# Patient Record
Sex: MALE | Race: OTHER | HISPANIC OR LATINO
[De-identification: names, ages, dates, MRNs, and addresses within clinical notes are randomized per-mention and may not be internally consistent; named-entity substitution may affect disease eponyms.]

---

## 2018-09-06 ENCOUNTER — TRANSCRIPTION ENCOUNTER (OUTPATIENT)
Age: 53
End: 2018-09-06

## 2018-09-07 ENCOUNTER — EMERGENCY (EMERGENCY)
Facility: HOSPITAL | Age: 53
LOS: 1 days | Discharge: HOME | End: 2018-09-07
Admitting: EMERGENCY MEDICINE
Payer: COMMERCIAL

## 2018-09-07 VITALS
SYSTOLIC BLOOD PRESSURE: 163 MMHG | DIASTOLIC BLOOD PRESSURE: 80 MMHG | OXYGEN SATURATION: 97 % | HEART RATE: 86 BPM | TEMPERATURE: 99 F | RESPIRATION RATE: 18 BRPM

## 2018-09-07 PROBLEM — Z00.00 ENCOUNTER FOR PREVENTIVE HEALTH EXAMINATION: Status: ACTIVE | Noted: 2018-09-07

## 2018-09-07 LAB
ANION GAP SERPL CALC-SCNC: 12 MMOL/L — SIGNIFICANT CHANGE UP (ref 5–17)
BUN SERPL-MCNC: 13 MG/DL — SIGNIFICANT CHANGE UP (ref 7–23)
CALCIUM SERPL-MCNC: 8.9 MG/DL — SIGNIFICANT CHANGE UP (ref 8.4–10.5)
CHLORIDE SERPL-SCNC: 106 MMOL/L — SIGNIFICANT CHANGE UP (ref 96–108)
CO2 SERPL-SCNC: 23 MMOL/L — SIGNIFICANT CHANGE UP (ref 22–31)
CREAT SERPL-MCNC: 0.85 MG/DL — SIGNIFICANT CHANGE UP (ref 0.5–1.3)
GLUCOSE SERPL-MCNC: 129 MG/DL — HIGH (ref 70–99)
HCT VFR BLD CALC: 45 % — SIGNIFICANT CHANGE UP (ref 39–50)
HGB BLD-MCNC: 15.6 G/DL — SIGNIFICANT CHANGE UP (ref 13–17)
MCHC RBC-ENTMCNC: 30.5 PG — SIGNIFICANT CHANGE UP (ref 27–34)
MCHC RBC-ENTMCNC: 34.6 GM/DL — SIGNIFICANT CHANGE UP (ref 32–36)
MCV RBC AUTO: 88.3 FL — SIGNIFICANT CHANGE UP (ref 80–100)
PLATELET # BLD AUTO: 206 K/UL — SIGNIFICANT CHANGE UP (ref 150–400)
POTASSIUM SERPL-MCNC: 3.9 MMOL/L — SIGNIFICANT CHANGE UP (ref 3.5–5.3)
POTASSIUM SERPL-SCNC: 3.9 MMOL/L — SIGNIFICANT CHANGE UP (ref 3.5–5.3)
RBC # BLD: 5.1 M/UL — SIGNIFICANT CHANGE UP (ref 4.2–5.8)
RBC # FLD: 12.5 % — SIGNIFICANT CHANGE UP (ref 10.3–14.5)
SODIUM SERPL-SCNC: 141 MMOL/L — SIGNIFICANT CHANGE UP (ref 135–145)
WBC # BLD: 7.3 K/UL — SIGNIFICANT CHANGE UP (ref 3.8–10.5)
WBC # FLD AUTO: 7.3 K/UL — SIGNIFICANT CHANGE UP (ref 3.8–10.5)

## 2018-09-07 PROCEDURE — 85027 COMPLETE CBC AUTOMATED: CPT

## 2018-09-07 PROCEDURE — 76377 3D RENDER W/INTRP POSTPROCES: CPT | Mod: 26

## 2018-09-07 PROCEDURE — 99284 EMERGENCY DEPT VISIT MOD MDM: CPT

## 2018-09-07 PROCEDURE — 70486 CT MAXILLOFACIAL W/O DYE: CPT | Mod: 26

## 2018-09-07 PROCEDURE — 70450 CT HEAD/BRAIN W/O DYE: CPT | Mod: 26

## 2018-09-07 PROCEDURE — 90715 TDAP VACCINE 7 YRS/> IM: CPT

## 2018-09-07 PROCEDURE — 90471 IMMUNIZATION ADMIN: CPT

## 2018-09-07 PROCEDURE — 96361 HYDRATE IV INFUSION ADD-ON: CPT

## 2018-09-07 PROCEDURE — 70486 CT MAXILLOFACIAL W/O DYE: CPT

## 2018-09-07 PROCEDURE — 76377 3D RENDER W/INTRP POSTPROCES: CPT

## 2018-09-07 PROCEDURE — 80048 BASIC METABOLIC PNL TOTAL CA: CPT

## 2018-09-07 PROCEDURE — 70450 CT HEAD/BRAIN W/O DYE: CPT

## 2018-09-07 PROCEDURE — 96365 THER/PROPH/DIAG IV INF INIT: CPT

## 2018-09-07 PROCEDURE — 99284 EMERGENCY DEPT VISIT MOD MDM: CPT | Mod: 25

## 2018-09-07 RX ORDER — SODIUM CHLORIDE 9 MG/ML
1000 INJECTION, SOLUTION INTRAVENOUS
Qty: 0 | Refills: 0 | Status: DISCONTINUED | OUTPATIENT
Start: 2018-09-07 | End: 2018-09-11

## 2018-09-07 RX ORDER — CEFAZOLIN SODIUM 1 G
2000 VIAL (EA) INJECTION ONCE
Qty: 0 | Refills: 0 | Status: COMPLETED | OUTPATIENT
Start: 2018-09-07 | End: 2018-09-07

## 2018-09-07 RX ORDER — TETANUS TOXOID, REDUCED DIPHTHERIA TOXOID AND ACELLULAR PERTUSSIS VACCINE, ADSORBED 5; 2.5; 8; 8; 2.5 [IU]/.5ML; [IU]/.5ML; UG/.5ML; UG/.5ML; UG/.5ML
0.5 SUSPENSION INTRAMUSCULAR ONCE
Qty: 0 | Refills: 0 | Status: COMPLETED | OUTPATIENT
Start: 2018-09-07 | End: 2018-09-07

## 2018-09-07 RX ORDER — MORPHINE SULFATE 50 MG/1
4 CAPSULE, EXTENDED RELEASE ORAL ONCE
Qty: 0 | Refills: 0 | Status: DISCONTINUED | OUTPATIENT
Start: 2018-09-07 | End: 2018-09-07

## 2018-09-07 RX ADMIN — SODIUM CHLORIDE 100 MILLILITER(S): 9 INJECTION, SOLUTION INTRAVENOUS at 20:51

## 2018-09-07 RX ADMIN — Medication 2000 MILLIGRAM(S): at 12:05

## 2018-09-07 RX ADMIN — TETANUS TOXOID, REDUCED DIPHTHERIA TOXOID AND ACELLULAR PERTUSSIS VACCINE, ADSORBED 0.5 MILLILITER(S): 5; 2.5; 8; 8; 2.5 SUSPENSION INTRAMUSCULAR at 11:19

## 2018-09-07 RX ADMIN — Medication 100 MILLIGRAM(S): at 11:19

## 2018-09-07 NOTE — ED PROVIDER NOTE - PROGRESS NOTE DETAILS
Nj Waters MD, PGY3: Patient received at resident sign out. Dental removed loose tooth. Discussed with Dr. Carvalho regarding persistent subjective malocclusion of the jaw and Dr. Carvalho continues to recommend outpatient follow up with Dr. Houser. Will prescribe antibiotics Patient informed of ED visit findings, understands plan.  Patient provided with written and further verbal instructions not included in discharge paperwork.  Patient instructed to follow up with their primary care physician in 2-3 days and return for new, worsened, or persistent symptoms. Nj Waters, PGY3: Dr Carvalho returned call and requested evaluation in ED. Discussed with patient who will stay Nj Waters MD, PGY3: Patient received at resident sign out. Dental removed loose tooth. Discussed with Dr. Carvalho regarding persistent subjective malocclusion of the jaw and Dr. Carvalho continues to recommend outpatient follow up with Dr. Houser. Will prescribe antibiotics Nj Waters, PGY3: Fracture reduced by Dr Carvalho and dental. Stable for discharge. Patient informed of ED visit findings, understands plan.  Patient provided with written and further verbal instructions not included in discharge paperwork.  Patient instructed to follow up with their primary care physician in 2-3 days and return for new, worsened, or persistent symptoms. Nj Waters, PGY3: Discharged patient prior to getting information for pharmacy. Left voicemail at 1522 for followup requesting amoxicillin 500mg BID x 5 days. I also left voicemail for patient.

## 2018-09-07 NOTE — ED PROVIDER NOTE - ATTENDING CONTRIBUTION TO CARE
MD Erickson:  patient seen and evaluated with the resident.  I was present for key portions of the History & Physical, and I agree with the Impression & Plan.  MD Erickson:  54 yo M, complex L facial laceration s/p blunt trauma.  No LOC.  Extent of injury merits CT max/face & head.  EOMI, PERRL, no blurry vision no double vision.  Impression:  complex facial laceration.  plan: ct, plastic to fix.

## 2018-09-07 NOTE — CONSULT NOTE ADULT - SUBJECTIVE AND OBJECTIVE BOX
HPI: 53M w/ PMH HLD who was polishing a brick at around 930 AM on the day of presentation when the brick was kicked off the polisher and struck the patient in his face.  He did not lose consciousness and has no injuries other than his facial injuries.    He is complaining of pain in the left side of his face and a loose tooth (left maxillary lateral incisor).  He states that his teeth do not fit together when he tries to close his mouth and that his vision is different.      PMH  HLD (hyperlipidemia)    PSH  No significant past surgical history      Allergies    No Known Allergies          Physical Exam  T(C): --  HR: 68 (09-07-18 @ 10:40) (68 - 86)  BP: 130/81 (09-07-18 @ 10:40) (130/81 - 163/80)  RR: 16 (09-07-18 @ 10:40) (16 - 18)  SpO2: 98% (09-07-18 @ 10:40) (97% - 98%)  Wt(kg): --  Tmax: T(C): --  Wt(kg): --    Gen; NAD resting comfortably in stretcher breathing room air  HEENT:  Upper 1/3 of face:  Left eye with conjunctival injection.  No obvious step offs, lacerations, hematomas, or abrasions.  No evidence of enophthalmos.  EOMI, PERRLA. SILT bilaterally  Middle 1/3 of face: Left malar region with significant soft tissue swelling, consistent with buccal hematoma.  Stellate laceration from left nasolabial crease through upper lip.  Abrasion with some loss of epidermis over left cheek.  Some dried blood at nares.  Lower 1/3 of face: Through and through laceration of left upper lip s/p repair. Mobile left lateral incision of maxilla. Ecchymoses of buccal mucosa and mucosa of lip. No subungual hematoma.  Slight anterior open bite on left. Maxilla is not mobile.  Bimanual exam of the mandible reveals no mobility along the length of the mandible.  DEVON 3-4cm        Labs:                        15.6   7.3   )-----------( 206      ( 07 Sep 2018 10:54 )             45.0     09-07    141  |  106  |  13  ----------------------------<  129<H>  3.9   |  23  |  0.85    Ca    8.9      07 Sep 2018 10:54      Imaging  < from: CT Maxillofacial No Cont (09.07.18 @ 10:50) >  Maxillofacial CT:    A left premaxillary soft tissue hematoma is present with associated skin   laceration.    Comminuted fractures involve the anterior wall of the left maxillary   sinus, which extend cephalad to involve the inferior orbital rim and the   infraorbital foramen. Horizontally oriented fractures involve the   alveolus of the left maxilla, involving the roots of the lateral incisor   and canine teeth, with extension of the fracture margins posteriorly   through the alveolus to the base of the pterygoid plates. Some of the   fracture margins extend to the lateral most aspect of the hard palate,   and fractures involve the floor of the left maxillary sinus. Secondary   opacification of left maxillary sinus with hemorrhage is noted.    There is no evidence for globe rupture or post septal hemorrhage.    Nondisplaced linear lucencies are noted involving the right maxillary   sinus; it is not certain whether these reflect vascular grooves or   nondisplaced fractures.    The medial walls and floors of the orbits appear preserved.    IMPRESSION:    Head CT:    No evidence for calvarial fracture or acute intracranial hemorrhage.    Maxillofacial CT:    Complex fractures involve the left maxilla as described (LeFort type   fracture).    < end of copied text > HPI: 53M w/ PMH HLD who was polishing a brick at around 930 AM on the day of presentation when the brick was kicked off the polisher and struck the patient in his face.  He did not lose consciousness and has no injuries other than his facial injuries.    He is complaining of pain in the left side of his face and a loose tooth (left maxillary lateral incisor).  He states that his teeth do not fit together when he tries to close his mouth and that his vision is different.      PMH  HLD (hyperlipidemia)    PSH  No significant past surgical history      Allergies    No Known Allergies          Physical Exam  T(C): --  HR: 68 (09-07-18 @ 10:40) (68 - 86)  BP: 130/81 (09-07-18 @ 10:40) (130/81 - 163/80)  RR: 16 (09-07-18 @ 10:40) (16 - 18)  SpO2: 98% (09-07-18 @ 10:40) (97% - 98%)  Wt(kg): --  Tmax: T(C): --  Wt(kg): --    Gen; NAD resting comfortably in stretcher breathing room air  HEENT:  Upper 1/3 of face:  Left eye with conjunctival injection.  No obvious step offs, lacerations, hematomas, or abrasions.  No evidence of enophthalmos.  EOMI, PERRLA. SILT bilaterally.  Patient initially reports decreased visual acuity in left eye but on subsequent exam reports vision is clear  Middle 1/3 of face: Left malar region with significant soft tissue swelling, consistent with buccal hematoma.  Stellate laceration from left nasolabial crease through upper lip.  Abrasion with some loss of epidermis over left cheek.  Some dried blood at nares.  Lower 1/3 of face: Through and through laceration of left upper lip s/p repair. Mobile left lateral incision of maxilla. Ecchymoses of buccal mucosa and mucosa of lip. No subungual hematoma.  Slight anterior open bite on left. Maxilla is not mobile.  Bimanual exam of the mandible reveals no mobility along the length of the mandible.  DEVON 3-4cm        Labs:                        15.6   7.3   )-----------( 206      ( 07 Sep 2018 10:54 )             45.0     09-07    141  |  106  |  13  ----------------------------<  129<H>  3.9   |  23  |  0.85    Ca    8.9      07 Sep 2018 10:54      Imaging  < from: CT Maxillofacial No Cont (09.07.18 @ 10:50) >  Maxillofacial CT:    A left premaxillary soft tissue hematoma is present with associated skin   laceration.    Comminuted fractures involve the anterior wall of the left maxillary   sinus, which extend cephalad to involve the inferior orbital rim and the   infraorbital foramen. Horizontally oriented fractures involve the   alveolus of the left maxilla, involving the roots of the lateral incisor   and canine teeth, with extension of the fracture margins posteriorly   through the alveolus to the base of the pterygoid plates. Some of the   fracture margins extend to the lateral most aspect of the hard palate,   and fractures involve the floor of the left maxillary sinus. Secondary   opacification of left maxillary sinus with hemorrhage is noted.    There is no evidence for globe rupture or post septal hemorrhage.    Nondisplaced linear lucencies are noted involving the right maxillary   sinus; it is not certain whether these reflect vascular grooves or   nondisplaced fractures.    The medial walls and floors of the orbits appear preserved.    IMPRESSION:    Head CT:    No evidence for calvarial fracture or acute intracranial hemorrhage.    Maxillofacial CT:    Complex fractures involve the left maxilla as described (LeFort type   fracture).    < end of copied text > HPI: 53M w/ PMH HLD who was polishing a brick at around 930 AM on the day of presentation when the brick was kicked off the polisher and struck the patient in his face.  He did not lose consciousness and has no injuries other than his facial injuries.    He is complaining of pain in the left side of his face and a loose tooth (left maxillary lateral incisor).  He states that his teeth do not fit together when he tries to close his mouth and that his vision is different.      PMH  HLD (hyperlipidemia)    PSH  No significant past surgical history      Allergies    No Known Allergies          Physical Exam  T(C): --  HR: 68 (09-07-18 @ 10:40) (68 - 86)  BP: 130/81 (09-07-18 @ 10:40) (130/81 - 163/80)  RR: 16 (09-07-18 @ 10:40) (16 - 18)  SpO2: 98% (09-07-18 @ 10:40) (97% - 98%)  Wt(kg): --  Tmax: T(C): --  Wt(kg): --    Gen; NAD resting comfortably in stretcher breathing room air  HEENT:  Upper 1/3 of face:  Left eye with conjunctival injection.  No obvious step offs, lacerations, hematomas, or abrasions.  No evidence of enophthalmos.  EOMI, PERRLA. SILT bilaterally.  Patient initially reports decreased visual acuity in left eye but on subsequent exam reports vision is clear  Middle 1/3 of face: Left malar region with significant soft tissue swelling, consistent with buccal hematoma.  Stellate laceration from left nasolabial crease through upper lip.  Abrasion with some loss of epidermis over left cheek.  Some dried blood at nares.  No septal hematoma, but deviated septum noted.  No hemotympanum.  No etienne sign.  Lower 1/3 of face: Through and through laceration of left upper lip s/p repair. Mobile left lateral incision of maxilla. Ecchymoses of buccal mucosa and mucosa of lip. No subungual hematoma.  Slight anterior open bite on left. Maxilla is not mobile.  Bimanual exam of the mandible reveals no mobility along the length of the mandible.  DEVON 3-4cm        Labs:                        15.6   7.3   )-----------( 206      ( 07 Sep 2018 10:54 )             45.0     09-07    141  |  106  |  13  ----------------------------<  129<H>  3.9   |  23  |  0.85    Ca    8.9      07 Sep 2018 10:54      Imaging  < from: CT Maxillofacial No Cont (09.07.18 @ 10:50) >  Maxillofacial CT:    A left premaxillary soft tissue hematoma is present with associated skin   laceration.    Comminuted fractures involve the anterior wall of the left maxillary   sinus, which extend cephalad to involve the inferior orbital rim and the   infraorbital foramen. Horizontally oriented fractures involve the   alveolus of the left maxilla, involving the roots of the lateral incisor   and canine teeth, with extension of the fracture margins posteriorly   through the alveolus to the base of the pterygoid plates. Some of the   fracture margins extend to the lateral most aspect of the hard palate,   and fractures involve the floor of the left maxillary sinus. Secondary   opacification of left maxillary sinus with hemorrhage is noted.    There is no evidence for globe rupture or post septal hemorrhage.    Nondisplaced linear lucencies are noted involving the right maxillary   sinus; it is not certain whether these reflect vascular grooves or   nondisplaced fractures.    The medial walls and floors of the orbits appear preserved.    IMPRESSION:    Head CT:    No evidence for calvarial fracture or acute intracranial hemorrhage.    Maxillofacial CT:    Complex fractures involve the left maxilla as described (LeFort type   fracture).    < end of copied text >

## 2018-09-07 NOTE — ED ADULT NURSE NOTE - OBJECTIVE STATEMENT
53M comes to ED s/p lip injury while using a "stone saw". He states a large stone broke off and struck him in the face and head. He comes to ED via EMS. He has large laceration to upper lip in V shape with linear laceration to lower lip. He states "it doesn't really hurt". He has unknown last tdap. Bleeding is oozing. PMH high cholesterol. Has complaints of headache. denies blurry vision/N/V/D/abdominal pain/numbness/tingling. Will continue to monitor.

## 2018-09-07 NOTE — ED ADULT NURSE NOTE - NSIMPLEMENTINTERV_GEN_ALL_ED
Implemented All Universal Safety Interventions:  [place] to call system. Call bell, personal items and telephone within reach. Instruct patient to call for assistance. Room bathroom lighting operational. Non-slip footwear when patient is off stretcher. Physically safe environment: no spills, clutter or unnecessary equipment. Stretcher in lowest position, wheels locked, appropriate side rails in place.

## 2018-09-07 NOTE — ED PROVIDER NOTE - CONSTITUTIONAL, MLM
normal... +gross facial laceration, +protecting airway, minimal bleeding, appearing, well nourished, awake, alert, oriented to person, place, time/situation,

## 2018-09-07 NOTE — ED PROVIDER NOTE - OBJECTIVE STATEMENT
Onset:  1 hr PTA.  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  Associated Sx:  Zygoma swelling, no LOC, no neck pain.  NO AC.  Better/worse: no clear modifiers. Onset:  1 hr PTA.  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  Associated Sx:  Zygoma swelling, no LOC, no neck pain.  NO AC.  Better/worse: no clear modifiers.    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 when a piece of the stone flew off and hit him in the face, has large stellate facial laceration and complaining of blurred vision to L eye. Unknown last tetanus shot.

## 2018-09-07 NOTE — CONSULT NOTE ADULT - ASSESSMENT
A/P 53M s/p blunt injury to face resulting in soft tissue laceration and facial fracture  - Facial lacerations sutured by plastics (consult note to be dictated)  - Recommend dental consult for loose tooth  - Recommend ophthalmology consult for decreased visual acuity left eye without symptoms of diplopia  - Sinus precautions: Avoid nose blowing, sneeze with open mouth, avoid CPAP/BiPAP, and maintain HOB elevation. A/P 53M s/p blunt injury to face resulting in soft tissue laceration and facial fracture  - Facial lacerations sutured by plastics (consult note to be dictated)  - Recommend dental consult for loose tooth  - Sinus precautions: Avoid nose blowing, sneeze with open mouth, avoid CPAP/BiPAP, and maintain HOB elevation. A/P 53M s/p blunt injury to face resulting in soft tissue laceration and facial fracture  - Facial lacerations sutured by plastics (consult note to be dictated)  - Recommend dental consult for loose tooth  - Sinus precautions: Avoid nose blowing, sneeze with open mouth, avoid CPAP/BiPAP, and maintain HOB elevation.  - No evidence of significantly displaced fracture segments requiring operative fixation.  Recommend that patient continue soft diet and follow up as needed with Dr Houser.  Patient may call [phone] to schedule an appointment.    Discussed with Dr Houser and patient    Lou Cabrera PGY3

## 2018-09-08 VITALS
DIASTOLIC BLOOD PRESSURE: 84 MMHG | RESPIRATION RATE: 16 BRPM | TEMPERATURE: 100 F | HEART RATE: 90 BPM | SYSTOLIC BLOOD PRESSURE: 130 MMHG | OXYGEN SATURATION: 98 %

## 2018-09-08 RX ORDER — AMOXICILLIN 250 MG/5ML
1 SUSPENSION, RECONSTITUTED, ORAL (ML) ORAL
Qty: 10 | Refills: 0 | OUTPATIENT
Start: 2018-09-08 | End: 2018-09-12

## 2018-09-08 RX ADMIN — SODIUM CHLORIDE 1000 MILLILITER(S): 9 INJECTION, SOLUTION INTRAVENOUS at 02:12

## 2018-09-08 NOTE — ED POST DISCHARGE NOTE - RESULT SUMMARY
spoke with patient regarding need for abx, got pharmacy info. patient understands he needs to take amox BID x 5 days - Lida Lozada PA-C

## 2018-09-08 NOTE — PROGRESS NOTE ADULT - SUBJECTIVE AND OBJECTIVE BOX
Patient is a 53y old  Male who presents with a chief complaint of facial trauma    HPI: 53M w/ PMH HLD who was polishing a brick at around 930 AM on the day of presentation when the brick was kicked off the polisher and struck the patient in his face.  He did not lose consciousness and has no injuries other than his facial injuries.    He is complaining of pain in the left side of his face and a loose tooth (left maxillary lateral incisor).  He states that his teeth do not fit together when he tries to close his mouth and that his vision is different.      PAST MEDICAL & SURGICAL HISTORY:  HLD (hyperlipidemia)  No significant past surgical history        MEDICATIONS  (STANDING):  dextrose 5% + lactated ringers. 1000 milliLiter(s) (100 mL/Hr) IV Continuous <Continuous>    MEDICATIONS  (PRN):      Allergies    No Known Allergies    Intolerances        FAMILY HISTORY:      *SOCIAL HISTORY: (guardian or who pt came with), (smoking hx) Pt presents with wife and other family members    Vital Signs Last 24 Hrs  T(C): 37.3 (07 Sep 2018 23:36), Max: 37.3 (07 Sep 2018 23:36)  T(F): 99.1 (07 Sep 2018 23:36), Max: 99.1 (07 Sep 2018 23:36)  HR: 82 (07 Sep 2018 23:36) (68 - 86)  BP: 127/82 (07 Sep 2018 23:36) (127/82 - 163/80)  BP(mean): --  RR: 16 (07 Sep 2018 23:36) (16 - 18)  SpO2: 97% (07 Sep 2018 23:36) (97% - 98%)    EOE:  TMJ ( -  ) clicks                    ( -   ) pops                    (  -  ) crepitus             Mandible Limited range of motion             Facial bones: Maxilla appears to have a fracture              ( +  ) trismus             (- ) LAD             ( +  ) swelling             ( + ) asymmetry             ( +  ) palpation             ( -  ) SOB             ( -  ) dysphagia             ( -  ) LOC    IOE:  permanent dentition: grossly intact with multiple carious teeth and multiple missing teeth           tongue/FOM WNL           labial/buccal mucosa:  Hematoma noted on the right buccal mucosa           (  + ) percussion           (  + ) palpation           ( +  ) swelling      Radiographs: CT and panoramic and periapical x-rays obtained    LABS:                        15.6   7.3   )-----------( 206      ( 07 Sep 2018 10:54 )             45.0     09-07    141  |  106  |  13  ----------------------------<  129<H>  3.9   |  23  |  0.85    Ca    8.9      07 Sep 2018 10:54      WBC Count: 7.3 K/uL [3.8 - 10.5] (09-07 @ 10:54)    Platelet Count - Automated: 206 K/uL [150 - 400] (09-07 @ 10:54)      RADIOLOGY & ADDITIONAL STUDIES: CT results   A left premaxillary soft tissue hematoma is present with associated skin   laceration.    Comminuted fractures involve the anterior wall of the left maxillary   sinus, which extend cephalad to involve the inferior orbital rim and the   infraorbital foramen. Horizontally oriented fractures involve the   alveolus of the left maxilla, involving the roots of the lateral incisor   and canine teeth, with extension of the fracture margins posteriorly   through the alveolus to the base of the pterygoid plates. Some of the   fracture margins extend to the lateral most aspect of the hard palate,   and fractures involve the floor of the left maxillary sinus. Secondary   opacification of left maxillary sinus with hemorrhage is noted.    There is no evidence for globe rupture or post septal hemorrhage.  Nondisplaced linear lucencies are noted involving the right maxillary   sinus; it is not certain whether these reflect vascular grooves or   nondisplaced fractures.    The medial walls and floors of the orbits appear preserved.    IMPRESSION:    Head CT:    No evidence for calvarial fracture or acute intracranial hemorrhage.    Maxillofacial CT:    Complex fractures involve the left maxilla as described (LeFort type   fracture).  VIVIAN ZAYAS M.D., ATTENDING RADIOLOGIST   This document has been electronically signed. Sep 7 2018 1:31PM       ASSESSMENT: 53M w/ PMH HLD who was polishing a brick at around 930 AM on the day of presentation when the brick was kicked off the polisher and struck the patient in his face.  He did not lose consciousness and has no injuries other than his facial injuries. He is complaining of pain in the left side of his face and a loose tooth (left maxillary lateral incisor #10).  He states that his teeth do not fit together when he tries to close his mouth and that his vision is different. Dental consulted for evaluation of loose tooth. Multiple facial lacerations were re-approximated earlier by plastic surgery. CT appears to reveal a fracture of the maxilla. Tooth #10 presents with class III mobility, and can be manipulated when applying pressure to the lingual and buccal aspects of the maxilla. Pt has trismus, with swelling of the periorbital region, with redness of the conjunctiva. Hematoma noted intraorally on the left buccal mucosa. CT was obtained and appears to show a maxillary fracture. Panormaic x-ray and Periapical radiographs were obtained, showing a horizontal root fracture of #10 as well as a faint radiolucent fracture line extending from #10 posteriorly to 14. Pt also fractured tooth # 22 with a pinpoint pulpal exposure noted. Pt states that his bite is uneven and appears to be contacting #10 first upon occlusion.      PROCEDURE:  Verbal and written consent given. r/b/a of extraction of #10 explained in detail. 2 carpules of 2% Lidocaine with 1:100,000 epinephrine were administered vua buccal infiltrations to # 10 and 22. #22 pulpal exposure was then covered using Dycal (calcium Hydrioxide), and Vitrebond glass Ionomer. 37% Phosphoric acid etch was then applied and rinsed. Prime and bond applied and cured. Flowable composite was then placed, contoured and cured. #10 was deemed to have a horizontal root fracture. Coronal portion of #10 was then extracted with no complications. Socket was then curetted and debris removed. Socket was thoroughly irrigated using normal saline. 1x 3-0 chromic gut suture placed to help achieve hemostasis. Pt was instructed to close and was unable to bite into his normal occlusion. Pt was informed that there may be a maxillary alveolar fracture restricting him from being able to bite normally. Pt was informed that plastics will be paged to help assess the bite.   Pt was given guaze to bite on to achieve hemostasis.  Pt was released back to the care of the ED.     RECOMMENDATIONS:   1) F/u with OS for evaluation of maxillary fracture  2) Dental F/U with outpatient dentist for comprehensive dental care.   3) If any difficulty swallowing/breathing, fever occur, page medical.     Alexander Eng, SOHAIL, pager #[phone]  Oral surgeon consulted: Dr. Archer Patient is a 53y old  Male who presents with a chief complaint of facial trauma    HPI: 53M w/ PMH HLD who was polishing a brick at around 930 AM on the day of presentation when the brick was kicked off the polisher and struck the patient in his face.  He did not lose consciousness and has no injuries other than his facial injuries.    He is complaining of pain in the left side of his face and a loose tooth (left maxillary lateral incisor).  He states that his teeth do not fit together when he tries to close his mouth and that his vision is different.      PAST MEDICAL & SURGICAL HISTORY:  HLD (hyperlipidemia)  No significant past surgical history        MEDICATIONS  (STANDING):  dextrose 5% + lactated ringers. 1000 milliLiter(s) (100 mL/Hr) IV Continuous <Continuous>    MEDICATIONS  (PRN):      Allergies    No Known Allergies    Intolerances        FAMILY HISTORY:      *SOCIAL HISTORY: (guardian or who pt came with), (smoking hx) Pt presents with wife and other family members    Vital Signs Last 24 Hrs  T(C): 37.3 (07 Sep 2018 23:36), Max: 37.3 (07 Sep 2018 23:36)  T(F): 99.1 (07 Sep 2018 23:36), Max: 99.1 (07 Sep 2018 23:36)  HR: 82 (07 Sep 2018 23:36) (68 - 86)  BP: 127/82 (07 Sep 2018 23:36) (127/82 - 163/80)  BP(mean): --  RR: 16 (07 Sep 2018 23:36) (16 - 18)  SpO2: 97% (07 Sep 2018 23:36) (97% - 98%)    EOE:  TMJ ( -  ) clicks                    ( -   ) pops                    (  -  ) crepitus             Mandible Limited range of motion             Facial bones: Maxilla appears to have a fracture              ( +  ) trismus             (- ) LAD             ( +  ) swelling             ( + ) asymmetry             ( +  ) palpation             ( -  ) SOB             ( -  ) dysphagia             ( -  ) LOC    IOE:  permanent dentition: grossly intact with multiple carious teeth and multiple missing teeth           tongue/FOM WNL           labial/buccal mucosa:  Hematoma noted on the right buccal mucosa           (  + ) percussion           (  + ) palpation           ( +  ) swelling      Radiographs: CT and panoramic and periapical x-rays obtained    LABS:                        15.6   7.3   )-----------( 206      ( 07 Sep 2018 10:54 )             45.0     09-07    141  |  106  |  13  ----------------------------<  129<H>  3.9   |  23  |  0.85    Ca    8.9      07 Sep 2018 10:54      WBC Count: 7.3 K/uL [3.8 - 10.5] (09-07 @ 10:54)    Platelet Count - Automated: 206 K/uL [150 - 400] (09-07 @ 10:54)      RADIOLOGY & ADDITIONAL STUDIES: CT results   A left premaxillary soft tissue hematoma is present with associated skin   laceration.    Comminuted fractures involve the anterior wall of the left maxillary   sinus, which extend cephalad to involve the inferior orbital rim and the   infraorbital foramen. Horizontally oriented fractures involve the   alveolus of the left maxilla, involving the roots of the lateral incisor   and canine teeth, with extension of the fracture margins posteriorly   through the alveolus to the base of the pterygoid plates. Some of the   fracture margins extend to the lateral most aspect of the hard palate,   and fractures involve the floor of the left maxillary sinus. Secondary   opacification of left maxillary sinus with hemorrhage is noted.    There is no evidence for globe rupture or post septal hemorrhage.  Nondisplaced linear lucencies are noted involving the right maxillary   sinus; it is not certain whether these reflect vascular grooves or   nondisplaced fractures.    The medial walls and floors of the orbits appear preserved.    IMPRESSION:    Head CT:    No evidence for calvarial fracture or acute intracranial hemorrhage.    Maxillofacial CT:    Complex fractures involve the left maxilla as described (LeFort type   fracture).  VIVIAN ZAYAS M.D., ATTENDING RADIOLOGIST   This document has been electronically signed. Sep 7 2018 1:31PM       ASSESSMENT: 53M w/ PMH HLD who was polishing a brick at around 930 AM on the day of presentation when the brick was kicked off the polisher and struck the patient in his face.  He did not lose consciousness and has no injuries other than his facial injuries. He is complaining of pain in the left side of his face and a loose tooth (left maxillary lateral incisor #10).  He states that his teeth do not fit together when he tries to close his mouth and that his vision is different. Dental consulted for evaluation of loose tooth. Multiple facial lacerations were re-approximated earlier by plastic surgery. CT appears to reveal a fracture of the maxilla. Tooth #10 presents with class III mobility, and can be manipulated when applying pressure to the lingual and buccal aspects of the maxilla. Pt has trismus, with swelling of the periorbital region, with redness of the conjunctiva. Hematoma noted intraorally on the left buccal mucosa. CT was obtained and appears to show a maxillary fracture. Panormaic x-ray and Periapical radiographs were obtained, showing a horizontal root fracture of #10 as well as a faint radiolucent fracture line extending from #10 posteriorly to 14. Pt also fractured tooth # 22 with a pinpoint pulpal exposure noted. Pt states that his bite is uneven and appears to be contacting #10 first upon occlusion.      PROCEDURE:  Verbal and written consent given. r/b/a of extraction of #10 explained in detail. 2 carpules of 2% Lidocaine with 1:100,000 epinephrine were administered vua buccal infiltrations to # 10 and 22. #22 pulpal exposure was then covered using Dycal (calcium Hydrioxide), and Vitrebond glass Ionomer. 37% Phosphoric acid etch was then applied and rinsed. Prime and bond applied and cured. Flowable composite was then placed, contoured and cured. #10 was deemed to have a horizontal root fracture. Coronal portion of #10 was then extracted with no complications. Socket was then curetted and debris removed. Socket was thoroughly irrigated using normal saline. 1x 3-0 chromic gut suture placed to help achieve hemostasis. Pt was instructed to close and was unable to bite into his normal occlusion. Pt was informed that there may be a maxillary alveolar fracture restricting him from being able to bite normally. Pt was informed that plastics will be paged to help assess the bite.   Pt was given guaze to bite on to achieve hemostasis.  Pt was released back to the care of the ED.     RECOMMENDATIONS:   1) F/u with OS/plastics for evaluation of maxillary fracture and extraction of # 10   2) Dental F/U with outpatient dentist for comprehensive dental care.   3) If any difficulty swallowing/breathing, fever occur, page medical.     Alexander Eng DMD, pager #[phone]  Oral surgeon consulted: Dr. Archer

## 2018-09-08 NOTE — CHART NOTE - NSCHARTNOTEFT_GEN_A_CORE
Closed reduction of left dentoalveolar fracture was performed at bedside. Lidocaine was infiltrated into the surrounding tissues prior to reduction. After reduction right sided open bite was corrected and fracture was stable.    -recommend non-chew diet for 4 weeks  -abx x1 week  -follow up with Dr. Archer in office next week.

## 2018-09-08 NOTE — ED ADULT NURSE REASSESSMENT NOTE - NS ED NURSE REASSESS COMMENT FT1
ED MD Waters aware of patient's repeat vital signs, patient still oked to be discharged.  Family at the bedside.  Patient states he feels better.

## 2022-12-27 ENCOUNTER — EMERGENCY (EMERGENCY)
Facility: HOSPITAL | Age: 57
LOS: 1 days | Discharge: HOME | End: 2022-12-27
Attending: EMERGENCY MEDICINE
Payer: COMMERCIAL

## 2022-12-27 VITALS
OXYGEN SATURATION: 100 % | WEIGHT: 235.01 LBS | HEIGHT: 68 IN | HEART RATE: 75 BPM | SYSTOLIC BLOOD PRESSURE: 171 MMHG | DIASTOLIC BLOOD PRESSURE: 95 MMHG | TEMPERATURE: 99 F | RESPIRATION RATE: 18 BRPM

## 2022-12-27 VITALS
DIASTOLIC BLOOD PRESSURE: 91 MMHG | HEART RATE: 72 BPM | OXYGEN SATURATION: 98 % | SYSTOLIC BLOOD PRESSURE: 158 MMHG | TEMPERATURE: 98 F | RESPIRATION RATE: 18 BRPM

## 2022-12-27 PROBLEM — E78.5 HYPERLIPIDEMIA, UNSPECIFIED: Chronic | Status: ACTIVE | Noted: 2018-09-07

## 2022-12-27 PROCEDURE — 70450 CT HEAD/BRAIN W/O DYE: CPT | Mod: 26,MA

## 2022-12-27 PROCEDURE — 93005 ELECTROCARDIOGRAM TRACING: CPT

## 2022-12-27 PROCEDURE — 99285 EMERGENCY DEPT VISIT HI MDM: CPT

## 2022-12-27 PROCEDURE — 99284 EMERGENCY DEPT VISIT MOD MDM: CPT | Mod: 25

## 2022-12-27 PROCEDURE — 70450 CT HEAD/BRAIN W/O DYE: CPT | Mod: MA

## 2022-12-27 RX ORDER — IBUPROFEN 200 MG
600 TABLET ORAL ONCE
Refills: 0 | Status: COMPLETED | OUTPATIENT
Start: 2022-12-27 | End: 2022-12-27

## 2022-12-27 RX ORDER — SODIUM CHLORIDE 9 MG/ML
1000 INJECTION INTRAMUSCULAR; INTRAVENOUS; SUBCUTANEOUS ONCE
Refills: 0 | Status: DISCONTINUED | OUTPATIENT
Start: 2022-12-27 | End: 2022-12-27

## 2022-12-27 RX ORDER — KETOROLAC TROMETHAMINE 30 MG/ML
30 SYRINGE (ML) INJECTION ONCE
Refills: 0 | Status: DISCONTINUED | OUTPATIENT
Start: 2022-12-27 | End: 2022-12-27

## 2022-12-27 RX ADMIN — Medication 600 MILLIGRAM(S): at 17:03

## 2022-12-27 NOTE — ED PROVIDER NOTE - PROGRESS NOTE DETAILS
CT head with nonspecific sinus inflammation which does not correlate clinically with acute sinusitis, but no other pathology.  Advised strict return precautions and PMD and neuro f/u.

## 2022-12-27 NOTE — ED ADULT NURSE NOTE - ED STAT RN HANDOFF DETAILS 2
Patient verbalized understanding D/C instructions to: Take medications as prescribed, follow up with PCP or neurology & return for sudden or worsening symptoms.

## 2022-12-27 NOTE — ED PROVIDER NOTE - NSFOLLOWUPINSTRUCTIONS_ED_ALL_ED_FT
COVID-19      COVID-19, or coronavirus disease 2019, is a systemic infection that is caused by a novel coronavirus called SARS-CoV-2. In some people, the virus may not cause any symptoms. In others, it may cause mild or severe symptoms. Some people with severe infection develop severe disease, which may lead to acute respiratory distress syndrome and shock.      What are the causes?  The human body, showing how the coronavirus travels from the air to a person's lungs.   This illness is caused by a virus. The virus may be in the air or on surfaces as droplets or aerosols of various sizes. You may catch the virus by:  •Breathing in droplets from an infected person. Droplets can be spread by a person breathing, speaking, singing, coughing, or sneezing.      •Touching something, like a table or a doorknob, that was exposed to the virus (is contaminated) and then touching your mouth, nose, or eyes.        What increases the risk?    Risk for infection:     You are more likely to become infected with the COVID-19 virus if:  •You are within 6 ft (1.8 m) of a person with COVID-19 for 15 minutes or longer.      •You are providing care for a person who is infected with COVID-19.      •You are in close personal contact with other people. Close personal contact includes hugging, kissing, or sharing eating or drinking utensils.      Risk for serious illness due to COVID-19:    You are more likely to become seriously ill from the COVID-19 virus if:  •You have cancer.    •You have a long-term (chronic) disease, such as:  •Chronic lung disease, including pulmonary embolism, chronic obstructive pulmonary disease, and cystic fibrosis.      •Long-term disease that lowers your body's ability to fight infection (immunocompromise).      •Serious cardiac conditions, such as heart failure, coronary artery disease, or cardiomyopathy.      •Diabetes.      •Chronic kidney disease.      •Liver diseases, including cirrhosis, nonalcoholic fatty liver disease, alcoholic liver disease, or autoimmune hepatitis.        •You are obese.      •You are pregnant or recently pregnant.      •You have sickle cell disease.        What are the signs or symptoms?    Symptoms of this condition can range from mild to severe. Symptoms may appear any time from 2 to 14 days after being exposed to the virus. They include:  •Fever or chills.      •Difficulty breathing or having shortness of breath.      •Feeling tired or very tired.      •Headaches, body aches, or muscle aches.      •Runny or stuffy nose, sneezing, cough, sore throat.      •New loss of taste or smell. This is rare.      Some people may also have stomach problems, such as nausea, vomiting, or diarrhea.    Other people may not have any symptoms of COVID-19.      How is this diagnosed?  A sample being collected by swabbing the nose.   This condition may be diagnosed by testing samples to check for the COVID-19 virus. The most common tests are the PCR test and the antigen test. Tests may be done in the lab or at home. They include:  •Using a swab to take a sample of fluid from the back of your nose and throat (nasopharyngeal fluid), from your nose, or from your throat.      •Testing a sample of saliva from your mouth.      •Testing a sample of coughed-up mucus from your lungs (sputum).        How is this treated?    Treatment for COVID-19 infection depends on the severity of the condition.  •Mild symptoms can be managed at home with rest, fluids, and over-the-counter medicines.    •Serious symptoms may be treated in a hospital intensive care unit, or ICU. Treatment in the ICU may include:  •Supplemental oxygen. Extra oxygen is given through a tube in the nose, a face mask, or a goff.    •Medicines. You may be given medicines:  •To help your body fight off certain viruses that can cause disease (antivirals).      •To reduce inflammation and suppress the immune system (corticosteroids).      •To prevent or treat blood clots, if they develop (antithrombotics).        •Antibodies made in a lab that can restore or strengthen your body's natural immune system (monoclonal antibody).      •Positioning you to lie on your stomach (prone position). This makes it easier for oxygen to get into the lungs.      •Infection control measures.        If you are at risk for more serious illness due to COVID-19, your health care provider may prescribe a combination of two long-acting monoclonal antibodies, administered together every 6 months.      How is this prevented?    To protect yourself:   •Get vaccinated. COVID-19 vaccines are available for everyone aged 6 months and older.  •Vaccination is recommended for women who are pregnant, may become pregnant, or are breastfeeding.      •Patients who require major surgery should plan their vaccination for several days before or after the surgery.      •Talk to your health care provider about receiving experimental monoclonal antibodies. This treatment has been approved under emergency use authorization to prevent severe illness before or after you are exposed to the COVID-19 virus. You may be given monoclonal antibodies if:  •You are moderately or severely immunocompromised. This includes treatments that lower your immune response. People with immunocompromise may not develop protection against COVID-19 when they are vaccinated.      •You cannot be vaccinated. You may not receive a vaccine if you have a severe allergic reaction to the vaccine or its components.      •You are not fully vaccinated.      •You are in close contact with a person who is infected with SARS-CoV-2, or at high risk of exposure to SARS-CoV-2, in an institution in which COVID-19 is occurring.      •You are at risk of illness from new variants of the COVID-19 virus.        To protect others:     If you have symptoms of COVID-19, take steps to prevent the virus from spreading to others.  •Stay home. Leave your house only to seek medical care. Do not use public transport, if possible.      •Do not travel while you are sick.      •Wash your hands often with soap and water for at least 20 seconds. If soap and water are not available, use alcohol-based hand .      •Stay away from other members of your household. Let healthy household members care for children and pets, if possible. If you have to care for children or pets, wash your hands often and wear a mask. If possible, stay in your own room, separate from others. Use a different bathroom.      •Make sure that all people in your household wash their hands well and often.      •Cough or sneeze into a tissue or your sleeve or elbow. Do not cough or sneeze into your hand or into the air.        Where to find more information    •Centers for Disease Control and Prevention: www.cdc.gov/coronavirus      •World Health Organization: www.who.int/health-topics/coronavirus        Get help right away if:    •You have trouble breathing.      •You have pain or pressure in your chest.      •You have confusion.      •You have bluish lips and fingernails.      •You have difficulty waking from sleep.      •You have symptoms that get worse.      These symptoms may be an emergency. Get help right away. Call 911.   • Do not wait to see if the symptoms will go away.        •  Do not drive yourself to the hospital.         Summary    •COVID-19 is a respiratory infection that is caused by a virus.      •Some people with a severe COVID-19 infection develop severe disease, which may lead to acute respiratory distress syndrome and shock.      •The virus that causes COVID-19 is contagious. This means that it can spread from person to person through droplets from breathing, speaking, singing, coughing, or sneezing.      •Mild symptoms of COVID-19 can be managed at home with rest, fluids, and over-the-counter medicines.      This information is not intended to replace advice given to you by your health care provider. Make sure you discuss any questions you have with your health care provider.      Document Revised: 11/03/2022 Document Reviewed: 11/02/2022    [employer] Patient Education © 2022 [employer] General Headache Without Cause      A headache is pain or discomfort felt around the head or neck area. There are many causes and types of headaches. A few common types include:  •Tension headaches.      •Migraine headaches.      •Cluster headaches.      •Chronic daily headaches.      Sometimes, the specific cause of a headache may not be found.      Follow these instructions at home:    Watch your condition for any changes. Let your health care provider know about them. Take these steps to help with your condition:      Managing pain       A bag of ice on a towel on the skin.        A heating pad for use on the painful area.      •Take over-the-counter and prescription medicines only as told by your health care provider. Treatment may include medicines for pain that are taken by mouth or applied to the skin.      •Lie down in a dark, quiet room when you have a headache.      •Keep lights dim if bright lights bother you or make your headaches worse.    •If directed, put ice on your head and neck area:  •Put ice in a plastic bag.      •Place a towel between your skin and the bag.      •Leave the ice on for 20 minutes, 2–3 times per day.      •Remove the ice if your skin turns bright red. This is very important. If you cannot feel pain, heat, or cold, you have a greater risk of damage to the area.      •If directed, apply heat to the affected area. Use the heat source that your health care provider recommends, such as a moist heat pack or a heating pad.  •Place a towel between your skin and the heat source.      •Leave the heat on for 20–30 minutes.      •Remove the heat if your skin turns bright red. This is especially important if you are unable to feel pain, heat, or cold. You have a greater risk of getting burned.        Eating and drinking     •Eat meals on a regular schedule.    •If you drink alcohol:•Limit how much you have to:  •0–1 drink a day for women who are not pregnant.      • 0–2 drinks a day for men.         •Know how much alcohol is in a drink. In the [place] one drink equals one 12 oz bottle of beer (355 mL), one 5 oz glass of wine (148 mL), or one 1½ oz glass of hard liquor (44 mL).        •Stop drinking caffeine, or decrease the amount of caffeine you drink.      •Drink enough fluid to keep your urine pale yellow.        General instructions   A person writing in a journal.  •Keep a headache journal to help find out what may trigger your headaches. For example, write down:  •What you eat and drink.      •How much sleep you get.      •Any change to your diet or medicines.        •Try massage or other relaxation techniques.      •Limit stress.      •Sit up straight, and do not tense your muscles.      • Do not use any products that contain nicotine or tobacco. These products include cigarettes, chewing tobacco, and vaping devices, such as e-cigarettes. If you need help quitting, ask your health care provider.      •Exercise regularly as told by your health care provider.      •Sleep on a regular schedule. Get 7–9 hours of sleep each night, or the amount recommended by your health care provider.      •Keep all follow-up visits. This is important.        Contact a health care provider if:    •Medicine does not help your symptoms.      •You have a headache that is different from your usual headache.      •You have nausea or you vomit.      •You have a fever.        Get help right away if:  •Your headache:  •Becomes severe quickly.      •Gets worse after moderate to intense physical activity.      •You have any of these symptoms:  •Repeated vomiting.      •Pain or stiffness in your neck.      •Changes to your vision.      •Pain in an eye or ear.      •Problems with speech.      •Muscular weakness or loss of muscle control.      •Loss of balance or coordination.        •You feel faint or pass out.      •You have confusion.      •You have a seizure.      These symptoms may represent a serious problem that is an emergency. Do not wait to see if the symptoms will go away. Get medical help right away. Call your local emergency services (911 in the [place] Do not drive yourself to the hospital.       Summary    •A headache is pain or discomfort felt around the head or neck area.      •There are many causes and types of headaches. In some cases, the cause may not be found.      •Keep a headache journal to help find out what may trigger your headaches. Watch your condition for any changes. Let your health care provider know about them.      •Contact a health care provider if you have a headache that is different from the usual headache, or if your symptoms are not helped by medicine.      •Get help right away if your headache becomes severe, you vomit, you have a loss of vision, you lose your balance, or you have a seizure.      This information is not intended to replace advice given to you by your health care provider. Make sure you discuss any questions you have with your health care provider.      Document Revised: 05/18/2022 Document Reviewed: 05/18/2022    [employer] Patient Education © 2022 [employer]

## 2022-12-27 NOTE — ED PROVIDER NOTE - CLINICAL SUMMARY MEDICAL DECISION MAKING FREE TEXT BOX
56 y/o man, h/o HL, c/o diffuse headache x 3 wks, constant, worse in the morning.  Denies N/V/fever/neck stiffness/focal weakness/numbness.  Occasionally has lightheadedness--CT head due to 3 wks persistent headache worse in morning.

## 2022-12-27 NOTE — ED PROVIDER NOTE - PATIENT PORTAL LINK FT
You can access the FollowMyHealth Patient Portal offered by Health system by registering at the following website: [email or web address]. By joining [employer]s FollowMyHealth portal, you will also be able to view your health information using other applications (apps) compatible with our system.

## 2022-12-27 NOTE — ED PROVIDER NOTE - OBJECTIVE STATEMENT
56 y/o man, h/o HL, c/o diffuse headache x 3 wks, constant, worse in the morning.  Denies N/V/fever/neck stiffness/focal weakness/numbness.  Occasionally has lightheadedness.  No syncope.  Onset was gradual.  Tried OTC meds including Tylenol without relief.  Denies headache hx.

## 2022-12-27 NOTE — ED PROVIDER NOTE - NSFOLLOWUPCLINICS_GEN_ALL_ED_FT
[facility]  Neurology  [address] [place] [address]  Phone: [phone]  Fax: [phone]  Follow Up Time: 4-6 Days

## 2022-12-27 NOTE — ED PROVIDER NOTE - NEUROLOGICAL, MLM
Alert and oriented, no focal deficits, no motor or sensory deficits.  No neck stiffness.  Normal speech/gait/coordination.